# Patient Record
Sex: FEMALE | Race: WHITE
[De-identification: names, ages, dates, MRNs, and addresses within clinical notes are randomized per-mention and may not be internally consistent; named-entity substitution may affect disease eponyms.]

---

## 2017-06-19 ENCOUNTER — HOSPITAL ENCOUNTER (OUTPATIENT)
Dept: HOSPITAL 53 - M SFHCCLAY | Age: 77
End: 2017-06-19
Attending: FAMILY MEDICINE
Payer: MEDICARE

## 2017-06-19 DIAGNOSIS — E55.9: ICD-10-CM

## 2017-06-19 DIAGNOSIS — E78.2: Primary | ICD-10-CM

## 2017-06-19 PROCEDURE — 80053 COMPREHEN METABOLIC PANEL: CPT

## 2017-06-19 PROCEDURE — 80061 LIPID PANEL: CPT

## 2017-06-19 PROCEDURE — 82652 VIT D 1 25-DIHYDROXY: CPT

## 2017-06-20 LAB
ALBUMIN SERPL BCG-MCNC: 4 GM/DL (ref 3.2–5.2)
ALBUMIN/GLOB SERPL: 1.48 {RATIO} (ref 1–1.93)
ALP SERPL-CCNC: 57 U/L (ref 45–117)
ALT SERPL W P-5'-P-CCNC: 30 U/L (ref 12–78)
ANION GAP SERPL CALC-SCNC: 9 MEQ/L (ref 8–16)
AST SERPL-CCNC: 20 U/L (ref 15–37)
BILIRUB SERPL-MCNC: 0.4 MG/DL (ref 0.2–1)
BUN SERPL-MCNC: 15 MG/DL (ref 7–18)
CALCIUM SERPL-MCNC: 9 MG/DL (ref 8.8–10.2)
CHLORIDE SERPL-SCNC: 101 MEQ/L (ref 98–107)
CHOLEST SERPL-MCNC: 297 MG/DL (ref ?–200)
CO2 SERPL-SCNC: 29 MEQ/L (ref 21–32)
CREAT SERPL-MCNC: 0.71 MG/DL (ref 0.55–1.02)
GFR SERPL CREATININE-BSD FRML MDRD: > 60 ML/MIN/{1.73_M2} (ref 39–?)
GLUCOSE SERPL-MCNC: 93 MG/DL (ref 83–110)
POTASSIUM SERPL-SCNC: 3.7 MEQ/L (ref 3.5–5.1)
PROT SERPL-MCNC: 6.7 GM/DL (ref 6.4–8.2)
SODIUM SERPL-SCNC: 139 MEQ/L (ref 136–145)
TRIGL SERPL-MCNC: 101 MG/DL (ref ?–150)

## 2017-12-06 ENCOUNTER — HOSPITAL ENCOUNTER (OUTPATIENT)
Dept: HOSPITAL 53 - M SFHCCLAY | Age: 77
End: 2017-12-06
Attending: FAMILY MEDICINE
Payer: MEDICARE

## 2017-12-06 DIAGNOSIS — E78.2: Primary | ICD-10-CM

## 2017-12-06 LAB
ALBUMIN SERPL BCG-MCNC: 4.1 GM/DL (ref 3.2–5.2)
ALBUMIN/GLOB SERPL: 1.58 {RATIO} (ref 1–1.93)
ALP SERPL-CCNC: 54 U/L (ref 45–117)
ALT SERPL W P-5'-P-CCNC: 29 U/L (ref 12–78)
ANION GAP SERPL CALC-SCNC: 5 MEQ/L (ref 8–16)
AST SERPL-CCNC: 22 U/L (ref 7–37)
BILIRUB SERPL-MCNC: 0.5 MG/DL (ref 0.2–1)
BUN SERPL-MCNC: 10 MG/DL (ref 7–18)
CALCIUM SERPL-MCNC: 8.8 MG/DL (ref 8.8–10.2)
CHLORIDE SERPL-SCNC: 99 MEQ/L (ref 98–107)
CHOLEST SERPL-MCNC: 276 MG/DL (ref ?–200)
CO2 SERPL-SCNC: 34 MEQ/L (ref 21–32)
CREAT SERPL-MCNC: 0.53 MG/DL (ref 0.55–1.02)
GFR SERPL CREATININE-BSD FRML MDRD: > 60 ML/MIN/{1.73_M2} (ref 39–?)
GLUCOSE SERPL-MCNC: 88 MG/DL (ref 83–110)
POTASSIUM SERPL-SCNC: 4.1 MEQ/L (ref 3.5–5.1)
PROT SERPL-MCNC: 6.7 GM/DL (ref 6.4–8.2)
SODIUM SERPL-SCNC: 138 MEQ/L (ref 136–145)
TRIGL SERPL-MCNC: 59 MG/DL (ref ?–150)

## 2018-08-02 ENCOUNTER — HOSPITAL ENCOUNTER (OUTPATIENT)
Dept: HOSPITAL 53 - M CLY | Age: 78
End: 2018-08-02
Attending: NURSE PRACTITIONER
Payer: MEDICARE
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
## 2018-08-02 DIAGNOSIS — M19.042: Primary | ICD-10-CM
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
## 2018-08-02 PROCEDURE — 73130 X-RAY EXAM OF HAND: CPT

## 2018-11-06 ENCOUNTER — HOSPITAL ENCOUNTER (OUTPATIENT)
Dept: HOSPITAL 53 - M SFHCCLAY | Age: 78
End: 2018-11-06
Attending: FAMILY MEDICINE
Payer: MEDICARE

## 2018-11-06 DIAGNOSIS — Z01.818: Primary | ICD-10-CM

## 2018-11-06 DIAGNOSIS — C91.10: ICD-10-CM

## 2018-11-06 DIAGNOSIS — M79.674: ICD-10-CM

## 2018-11-06 DIAGNOSIS — E78.2: ICD-10-CM

## 2018-11-06 LAB
ALBUMIN/GLOBULIN RATIO: 1.38 (ref 1–1.93)
ALBUMIN: 4 GM/DL (ref 3.2–5.2)
ALKALINE PHOSPHATASE: 75 U/L (ref 45–117)
ALT SERPL W P-5'-P-CCNC: 23 U/L (ref 12–78)
ANION GAP: 9 MEQ/L (ref 8–16)
AST SERPL-CCNC: 21 U/L (ref 7–37)
BASO #: 0.1 10^3/UL (ref 0–0.2)
BASO %: 0.9 % (ref 0–1)
BILIRUBIN,TOTAL: 0.5 MG/DL (ref 0.2–1)
BLOOD UREA NITROGEN: 14 MG/DL (ref 7–18)
CALCIUM LEVEL: 9.4 MG/DL (ref 8.8–10.2)
CARBON DIOXIDE LEVEL: 31 MEQ/L (ref 21–32)
CHLORIDE LEVEL: 99 MEQ/L (ref 98–107)
CHOLEST SERPL-MCNC: 280 MG/DL (ref ?–200)
CHOLESTEROL RISK RATIO: 2.57 (ref ?–5)
CREATININE FOR GFR: 0.71 MG/DL (ref 0.55–1.3)
EOS #: 0.1 10^3/UL (ref 0–0.5)
EOSINOPHIL NFR BLD AUTO: 2 % (ref 0–3)
GFR SERPL CREATININE-BSD FRML MDRD: > 60 ML/MIN/{1.73_M2} (ref 39–?)
GLUCOSE, FASTING: 65 MG/DL (ref 70–100)
HDLC SERPL-MCNC: 109 MG/DL (ref 40–?)
HEMATOCRIT: 42.2 % (ref 36–47)
HEMOGLOBIN: 14 G/DL (ref 12–15.5)
IMMATURE GRANULOCYTE %: 0.1 % (ref 0–3)
INR: 0.94
LDL CHOLESTEROL: 161 MG/DL (ref ?–100)
LYMPH #: 2.2 10^3/UL (ref 1.5–4.5)
LYMPH %: 31.5 % (ref 24–44)
MEAN CORPUSCULAR HEMOGLOBIN: 32.3 PG (ref 27–33)
MEAN CORPUSCULAR HGB CONC: 33.2 G/DL (ref 32–36.5)
MEAN CORPUSCULAR VOLUME: 97.5 FL (ref 80–96)
MONO #: 0.7 10^3/UL (ref 0–0.8)
MONO %: 10.3 % (ref 0–5)
NEUTROPHILS #: 3.9 10^3/UL (ref 1.8–7.7)
NEUTROPHILS %: 55.2 % (ref 36–66)
NONHDLC SERPL-MCNC: 171 MG/DL
NRBC BLD AUTO-RTO: 0 % (ref 0–0)
PARTIAL THROMBOPLASTIN TIME: 23.6 SECONDS (ref 25.4–37.6)
PLATELET COUNT, AUTOMATED: 282 10^3/UL (ref 150–450)
POTASSIUM SERUM: 4.3 MEQ/L (ref 3.5–5.1)
PROTHROMBIN TIME: 12.7 SECONDS (ref 12.1–14.4)
RED BLOOD COUNT: 4.33 10^6/UL (ref 4–5.4)
RED CELL DISTRIBUTION WIDTH: 13 % (ref 11.5–14.5)
SODIUM LEVEL: 139 MEQ/L (ref 136–145)
TOTAL PROTEIN: 6.9 GM/DL (ref 6.4–8.2)
TRIGLYCERIDES LEVEL: 51 MG/DL (ref ?–150)
WHITE BLOOD COUNT: 7 10^3/UL (ref 4–10)

## 2018-11-06 PROCEDURE — 80053 COMPREHEN METABOLIC PANEL: CPT

## 2019-10-04 ENCOUNTER — HOSPITAL ENCOUNTER (OUTPATIENT)
Dept: HOSPITAL 53 - M SFHCCLAY | Age: 79
End: 2019-10-04
Attending: FAMILY MEDICINE
Payer: MEDICARE

## 2019-10-04 DIAGNOSIS — Z01.818: Primary | ICD-10-CM

## 2019-10-04 DIAGNOSIS — M21.611: ICD-10-CM

## 2019-10-04 LAB
ALBUMIN SERPL BCG-MCNC: 3.8 GM/DL (ref 3.2–5.2)
ALT SERPL W P-5'-P-CCNC: 21 U/L (ref 12–78)
BASOPHILS # BLD AUTO: 0.1 10^3/UL (ref 0–0.2)
BASOPHILS NFR BLD AUTO: 0.8 % (ref 0–1)
BILIRUB SERPL-MCNC: 0.3 MG/DL (ref 0.2–1)
BUN SERPL-MCNC: 15 MG/DL (ref 7–18)
CALCIUM SERPL-MCNC: 9.2 MG/DL (ref 8.8–10.2)
CHLORIDE SERPL-SCNC: 98 MEQ/L (ref 98–107)
CO2 SERPL-SCNC: 33 MEQ/L (ref 21–32)
CREAT SERPL-MCNC: 0.66 MG/DL (ref 0.55–1.3)
EOSINOPHIL # BLD AUTO: 0.1 10^3/UL (ref 0–0.5)
EOSINOPHIL NFR BLD AUTO: 1.6 % (ref 0–3)
GFR SERPL CREATININE-BSD FRML MDRD: > 60 ML/MIN/{1.73_M2} (ref 39–?)
GLUCOSE SERPL-MCNC: 70 MG/DL (ref 70–100)
HCT VFR BLD AUTO: 41.4 % (ref 36–47)
HGB BLD-MCNC: 13.4 G/DL (ref 12–15.5)
LYMPHOCYTES # BLD AUTO: 2 10^3/UL (ref 1.5–5)
LYMPHOCYTES NFR BLD AUTO: 33.4 % (ref 24–44)
MCH RBC QN AUTO: 31.7 PG (ref 27–33)
MCHC RBC AUTO-ENTMCNC: 32.4 G/DL (ref 32–36.5)
MCV RBC AUTO: 97.9 FL (ref 80–96)
MONOCYTES # BLD AUTO: 0.5 10^3/UL (ref 0–0.8)
MONOCYTES NFR BLD AUTO: 7.4 % (ref 0–5)
NEUTROPHILS # BLD AUTO: 3.4 10^3/UL (ref 1.5–8.5)
NEUTROPHILS NFR BLD AUTO: 56.6 % (ref 36–66)
PLATELET # BLD AUTO: 238 10^3/UL (ref 150–450)
POTASSIUM SERPL-SCNC: 4.4 MEQ/L (ref 3.5–5.1)
PROT SERPL-MCNC: 6.5 GM/DL (ref 6.4–8.2)
RBC # BLD AUTO: 4.23 10^6/UL (ref 4–5.4)
SODIUM SERPL-SCNC: 136 MEQ/L (ref 136–145)
WBC # BLD AUTO: 6.1 10^3/UL (ref 4–10)

## 2019-10-17 ENCOUNTER — HOSPITAL ENCOUNTER (OUTPATIENT)
Dept: HOSPITAL 53 - M CLY | Age: 79
End: 2019-10-17
Attending: FAMILY MEDICINE
Payer: MEDICARE

## 2019-10-17 DIAGNOSIS — Z01.818: Primary | ICD-10-CM

## 2019-10-17 DIAGNOSIS — M21.611: ICD-10-CM

## 2019-10-17 PROCEDURE — 71046 X-RAY EXAM CHEST 2 VIEWS: CPT

## 2019-10-17 PROCEDURE — 93005 ELECTROCARDIOGRAM TRACING: CPT

## 2019-10-17 NOTE — REP
Two-view chest:  10/17/2019.

 

Indication:  Preoperative assessment.

 

Comparison:  None.

 

Findings:

 

The lungs are clear.  There is no pleural effusion.  There is no pneumothorax.

Cardiac silhouette is unremarkable.

 

Impression:

 

No acute cardiopulmonary process.

 

 

Electronically Signed by

Torsten Vuong DO 10/17/2019 10:20 A

## 2021-02-01 ENCOUNTER — HOSPITAL ENCOUNTER (OUTPATIENT)
Dept: HOSPITAL 53 - M SFHCCLAY | Age: 81
End: 2021-02-01
Attending: FAMILY MEDICINE
Payer: MEDICARE

## 2021-02-01 DIAGNOSIS — E78.2: Primary | ICD-10-CM

## 2021-02-01 DIAGNOSIS — E55.9: ICD-10-CM

## 2021-02-01 PROCEDURE — 82652 VIT D 1 25-DIHYDROXY: CPT

## 2021-02-01 PROCEDURE — 80061 LIPID PANEL: CPT

## 2021-02-02 LAB
CHOLEST SERPL-MCNC: 257 MG/DL (ref ?–200)
CHOLEST/HDLC SERPL: 2.4 {RATIO} (ref ?–5)
HDLC SERPL-MCNC: 107 MG/DL (ref 40–?)
LDLC SERPL CALC-MCNC: 136 MG/DL (ref ?–100)
NONHDLC SERPL-MCNC: 150 MG/DL
TRIGL SERPL-MCNC: 70 MG/DL (ref ?–150)

## 2021-02-13 ENCOUNTER — HOSPITAL ENCOUNTER (OUTPATIENT)
Dept: HOSPITAL 53 - M LABSMTC | Age: 81
End: 2021-02-13
Attending: PEDIATRICS
Payer: SELF-PAY

## 2021-02-13 DIAGNOSIS — Z20.822: Primary | ICD-10-CM

## 2021-04-18 ENCOUNTER — HOSPITAL ENCOUNTER (OUTPATIENT)
Dept: HOSPITAL 53 - M LABSMTC | Age: 81
End: 2021-04-18
Attending: PEDIATRICS
Payer: SELF-PAY

## 2021-04-18 DIAGNOSIS — Z20.828: Primary | ICD-10-CM

## 2021-04-18 DIAGNOSIS — Z11.59: ICD-10-CM

## 2021-05-20 ENCOUNTER — HOSPITAL ENCOUNTER (OUTPATIENT)
Dept: HOSPITAL 53 - M PLARAD | Age: 81
End: 2021-05-20
Attending: PHYSICIAN ASSISTANT
Payer: MEDICARE

## 2021-05-20 DIAGNOSIS — G95.9: ICD-10-CM

## 2021-05-20 DIAGNOSIS — M48.03: ICD-10-CM

## 2021-05-20 DIAGNOSIS — M48.02: Primary | ICD-10-CM

## 2021-05-20 NOTE — REP
INDICATION:

MYELOPATHY, HEADACHES.



COMPARISON:

Comparison MRI study of the cervical spine is from September 5, 2019..



TECHNIQUE:

Sagittal and axial T1 and T2-weighted scans are acquired in the usual fashion with and

without fat saturation.  Sequences include spin echo, turbo spin-echo, and STIR

imaging sequences.



FINDINGS:

There is straightening and reversal of the normal cervical lordosis.  Cervical

vertebral body heights are preserved.  There is diffuse degenerative spondylosis in

the cervical spine as seen previously.  There is a degenerative, 5 mm

spondylolisthesis at C7-T1.  This previously measured 4 mm.  There is a stable 2 mm

C3-4 spondylolisthesis again degenerative in nature.  Mild spinal stenosis is again

noted at the C7-T1 disc level.  Midline AP dimension of the thecal sac at this level

is 7.8 mm.  This is unchanged.  The cervical cord remains normal in course, caliber

and signal intensity.  No focal T2 hyperintense lesion is seen within the substance of

the cord.



Posterior osteophytic ridging and disc bulging are seen at C3-4, C4-5, C5-6, and C6-7.

There is mild central canal stenosis at C5-6 due to these findings.  There is is

unchanged.  The previously noted T2 edema a in the soft tissues adjacent to the left

C4-5 facets is no longer apparent.  There is some reactive marrow edema associated

with degenerative disc disease changes at C5-6 C6-7.  This is unchanged.



IMPRESSION:

Advanced degenerative spondylosis changes.  Central canal stenosis at C5-6 and at

C7-T1.  There is a degenerative 5 mm C7-T1 spondylolisthesis, previously measured at 4

mm.  Findings are otherwise unchanged.





<Electronically signed by Eldon Mireles > 05/20/21 [id number]

## 2021-07-06 ENCOUNTER — HOSPITAL ENCOUNTER (OUTPATIENT)
Dept: HOSPITAL 53 - M PLAIMG | Age: 81
End: 2021-07-06
Attending: STUDENT IN AN ORGANIZED HEALTH CARE EDUCATION/TRAINING PROGRAM
Payer: MEDICARE

## 2021-07-06 DIAGNOSIS — M51.26: ICD-10-CM

## 2021-07-06 DIAGNOSIS — M51.36: ICD-10-CM

## 2021-07-06 DIAGNOSIS — M47.816: Primary | ICD-10-CM

## 2021-07-06 NOTE — REPVR
PROCEDURE INFORMATION: 

Exam: MR Lumbar Spine Without Contrast 

Exam date and time: 7/6/2021 11:21 AM 

Age: 80 years old 

Clinical indication: Low back pain and other: Neck 



TECHNIQUE: 

Imaging protocol: Multiplanar magnetic resonance images of the lumbar spine 

without intravenous contrast. 



COMPARISON: 

No relevant prior studies available. 



FINDINGS: 

Vertebrae: No acute compression fracture is seen. There is mild retrolisthesis 

of L2 on L3 and L3 on L4. There is mild anterolisthesis of L5 on S1. 

Spinal cord: The conus medullaris terminates at the T12-L1 level. There is no 

evidence of arachnoiditis or cauda equina compression. 

L1-L2: There is marked diffuse circumferential disc bulging, right 

extraforaminal osteophytic ridging, moderate facet arthropathy, and thickening 

of the ligamentum flavum. This is causing mild spinal canal stenosis, moderate 

narrowing of the right subarticular recess, and mild right neural foraminal 

narrowing. 

L2-L3: There is disc dehydration, severe disc space narrowing, marked diffuse 

circumferential disc bulging, mild facet arthropathy, and thickening of the 

ligamentum flavum. This is causing mild spinal canal stenosis, moderate 

narrowing of the left subarticular recess, and mild left neural foraminal 

narrowing. 

L3-L4: There is severe disc space narrowing, marked diffuse circumferential 

disc bulging, circumferential osteophytic ridging, thickening of the ligamentum 

flavum, and moderate facet arthropathy. This is causing mild spinal canal 

stenosis, moderate narrowing of the subarticular recesses, and moderate 

bilateral neural foraminal narrowing. 

L4-L5: There is moderate diffuse circumferential disc bulging, right foraminal 

and extraforaminal osteophytic ridging, moderate facet arthropathy, and 

thickening of the ligamentum flavum. There is no significant spinal canal 

stenosis. Moderate right and mild left neural foraminal narrowing is present. 

L5-S1: There is marked diffuse circumferential disc bulging and severe facet 

arthropathy. This is causing mild spinal canal stenosis, moderate narrowing of 

the right subarticular recess, moderate right neural foraminal narrowing, and 

mild left neural foraminal narrowing. 

Soft tissues: Unremarkable. 



IMPRESSION: 

Marked degenerative changes of the lumbar spine as discussed above 



Electronically signed by: Khari Camarena On 07/06/2021  15:16:13 PM

## 2021-09-24 ENCOUNTER — HOSPITAL ENCOUNTER (OUTPATIENT)
Dept: HOSPITAL 53 - M SFHCCLAY | Age: 81
End: 2021-09-24
Attending: FAMILY MEDICINE
Payer: MEDICARE

## 2021-09-24 DIAGNOSIS — E78.2: Primary | ICD-10-CM

## 2021-09-24 LAB
BUN SERPL-MCNC: 15 MG/DL (ref 7–18)
CALCIUM SERPL-MCNC: 9.6 MG/DL (ref 8.8–10.2)
CHLORIDE SERPL-SCNC: 97 MEQ/L (ref 98–107)
CO2 SERPL-SCNC: 30 MEQ/L (ref 21–32)
CREAT SERPL-MCNC: 0.59 MG/DL (ref 0.55–1.3)
GFR SERPL CREATININE-BSD FRML MDRD: > 60 ML/MIN/{1.73_M2} (ref 39–?)
GLUCOSE SERPL-MCNC: 89 MG/DL (ref 70–100)
POTASSIUM SERPL-SCNC: 4.1 MEQ/L (ref 3.5–5.1)
SODIUM SERPL-SCNC: 133 MEQ/L (ref 136–145)

## 2022-03-29 ENCOUNTER — HOSPITAL ENCOUNTER (OUTPATIENT)
Dept: HOSPITAL 53 - M SFHCCLAY | Age: 82
End: 2022-03-29
Attending: FAMILY MEDICINE
Payer: MEDICARE

## 2022-03-29 DIAGNOSIS — E78.2: ICD-10-CM

## 2022-03-29 DIAGNOSIS — K21.9: ICD-10-CM

## 2022-03-29 DIAGNOSIS — C91.10: ICD-10-CM

## 2022-03-29 DIAGNOSIS — E55.9: Primary | ICD-10-CM

## 2022-03-29 LAB
ALBUMIN SERPL BCG-MCNC: 3.7 GM/DL (ref 3.2–5.2)
ALT SERPL W P-5'-P-CCNC: 23 U/L (ref 12–78)
BASOPHILS NFR BLD MANUAL: 1 % (ref 0–1)
BILIRUB SERPL-MCNC: 0.2 MG/DL (ref 0.2–1)
BUN SERPL-MCNC: 15 MG/DL (ref 7–18)
CALCIUM SERPL-MCNC: 9.5 MG/DL (ref 8.8–10.2)
CHLORIDE SERPL-SCNC: 102 MEQ/L (ref 98–107)
CHOLEST SERPL-MCNC: 240 MG/DL (ref ?–200)
CHOLEST/HDLC SERPL: 2.45 {RATIO} (ref ?–5)
CO2 SERPL-SCNC: 33 MEQ/L (ref 21–32)
CREAT SERPL-MCNC: 0.61 MG/DL (ref 0.55–1.3)
EOSINOPHIL NFR BLD MANUAL: 2 % (ref 0–3)
GFR SERPL CREATININE-BSD FRML MDRD: > 60 ML/MIN/{1.73_M2} (ref 39–?)
GLUCOSE SERPL-MCNC: 76 MG/DL (ref 70–100)
HCT VFR BLD AUTO: 40.9 % (ref 36–47)
HDLC SERPL-MCNC: 98 MG/DL (ref 40–?)
HGB BLD-MCNC: 13.4 G/DL (ref 12–15.5)
LDLC SERPL CALC-MCNC: 133 MG/DL (ref ?–100)
LYMPHOCYTES NFR BLD MANUAL: 39 % (ref 16–44)
MAGNESIUM SERPL-MCNC: 2.3 MG/DL (ref 1.8–2.4)
MCH RBC QN AUTO: 32.6 PG (ref 27–33)
MCHC RBC AUTO-ENTMCNC: 32.8 G/DL (ref 32–36.5)
MCV RBC AUTO: 99.5 FL (ref 80–96)
MONOCYTES NFR BLD MANUAL: 5 % (ref 0–5)
NEUTROPHILS NFR BLD MANUAL: 40 % (ref 28–66)
NONHDLC SERPL-MCNC: 142 MG/DL
PLATELET # BLD AUTO: 172 10^3/UL (ref 150–450)
PLATELET BLD QL SMEAR: NORMAL
POTASSIUM SERPL-SCNC: 4.7 MEQ/L (ref 3.5–5.1)
PROT SERPL-MCNC: 6.1 GM/DL (ref 6.4–8.2)
RBC # BLD AUTO: 4.11 10^6/UL (ref 4–5.4)
RBC MORPH BLD: NORMAL
SODIUM SERPL-SCNC: 136 MEQ/L (ref 136–145)
TRIGL SERPL-MCNC: 45 MG/DL (ref ?–150)
VARIANT LYMPHS NFR BLD MANUAL: 13 % (ref 0–5)
WBC # BLD AUTO: 9.2 10^3/UL (ref 4–10)

## 2022-04-06 ENCOUNTER — HOSPITAL ENCOUNTER (OUTPATIENT)
Dept: HOSPITAL 53 - M CLY | Age: 82
End: 2022-04-06
Attending: PHYSICIAN ASSISTANT
Payer: MEDICARE
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
## 2022-04-06 DIAGNOSIS — S99.911A: Primary | ICD-10-CM
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
## 2022-11-14 ENCOUNTER — HOSPITAL ENCOUNTER (OUTPATIENT)
Dept: HOSPITAL 53 - M RAD | Age: 82
End: 2022-11-14
Attending: PHYSICIAN ASSISTANT
Payer: MEDICARE

## 2022-11-14 DIAGNOSIS — I83.813: Primary | ICD-10-CM

## 2022-12-06 ENCOUNTER — HOSPITAL ENCOUNTER (OUTPATIENT)
Dept: HOSPITAL 53 - M PLAIMG | Age: 82
End: 2022-12-06
Attending: FAMILY MEDICINE
Payer: MEDICARE

## 2022-12-06 DIAGNOSIS — M25.78: Primary | ICD-10-CM

## 2022-12-06 DIAGNOSIS — M47.892: ICD-10-CM

## 2023-01-26 ENCOUNTER — HOSPITAL ENCOUNTER (OUTPATIENT)
Dept: HOSPITAL 53 - M CLY | Age: 83
End: 2023-01-26
Attending: FAMILY MEDICINE
Payer: MEDICARE

## 2023-01-26 DIAGNOSIS — M25.572: Primary | ICD-10-CM

## 2023-08-16 ENCOUNTER — HOSPITAL ENCOUNTER (OUTPATIENT)
Dept: HOSPITAL 53 - M SFHCCLAY | Age: 83
End: 2023-08-16
Attending: FAMILY MEDICINE
Payer: MEDICARE

## 2023-08-16 DIAGNOSIS — R19.7: Primary | ICD-10-CM

## 2023-09-11 ENCOUNTER — HOSPITAL ENCOUNTER (OUTPATIENT)
Dept: HOSPITAL 53 - M SFHCCLAY | Age: 83
End: 2023-09-11
Attending: FAMILY MEDICINE
Payer: MEDICARE

## 2023-09-11 DIAGNOSIS — R19.7: Primary | ICD-10-CM

## 2023-09-11 DIAGNOSIS — E78.2: ICD-10-CM

## 2023-09-11 DIAGNOSIS — E55.9: ICD-10-CM

## 2023-09-11 DIAGNOSIS — Z78.9: ICD-10-CM

## 2023-09-11 LAB
ALBUMIN SERPL BCG-MCNC: 3.8 G/DL (ref 3.2–5.2)
ALP SERPL-CCNC: 93 U/L (ref 46–116)
ALT SERPL W P-5'-P-CCNC: 21 U/L (ref 7–40)
AMYLASE SERPL-CCNC: 65 U/L (ref 30–118)
AST SERPL-CCNC: 17 U/L (ref ?–34)
BASOPHILS # BLD AUTO: 0 10^3/UL (ref 0–0.2)
BASOPHILS NFR BLD AUTO: 0.3 % (ref 0–1)
BILIRUB SERPL-MCNC: 0.4 MG/DL (ref 0.3–1.2)
BUN SERPL-MCNC: 15 MG/DL (ref 9–23)
CALCIUM SERPL-MCNC: 9.6 MG/DL (ref 8.3–10.6)
CHLORIDE SERPL-SCNC: 102 MMOL/L (ref 98–107)
CHOLEST SERPL-MCNC: 212 MG/DL (ref ?–200)
CHOLEST/HDLC SERPL: 2.2 {RATIO} (ref ?–5)
CO2 SERPL-SCNC: 33 MMOL/L (ref 20–31)
CREAT SERPL-MCNC: 0.68 MG/DL (ref 0.55–1.3)
EOSINOPHIL # BLD AUTO: 0.1 10^3/UL (ref 0–0.5)
EOSINOPHIL NFR BLD AUTO: 1 % (ref 0–3)
GFR SERPL CREATININE-BSD FRML MDRD: > 60 ML/MIN/{1.73_M2} (ref 32–?)
GLUCOSE SERPL-MCNC: 84 MG/DL (ref 74–106)
HCT VFR BLD AUTO: 38.4 % (ref 36–47)
HDLC SERPL-MCNC: 96.3 MG/DL (ref 40–?)
HGB BLD-MCNC: 12.8 G/DL (ref 12–15.5)
LDLC SERPL CALC-MCNC: 105.1 MG/DL (ref ?–100)
LIPASE SERPL-CCNC: 37 U/L (ref 12–53)
LYMPHOCYTES # BLD AUTO: 4.4 10^3/UL (ref 1.5–5)
LYMPHOCYTES NFR BLD AUTO: 47 % (ref 24–44)
MAGNESIUM SERPL-MCNC: 2 MG/DL (ref 1.8–2.4)
MCH RBC QN AUTO: 33.1 PG (ref 27–33)
MCHC RBC AUTO-ENTMCNC: 33.3 G/DL (ref 32–36.5)
MCV RBC AUTO: 99.2 FL (ref 80–96)
MONOCYTES # BLD AUTO: 0.6 10^3/UL (ref 0–0.8)
MONOCYTES NFR BLD AUTO: 6.4 % (ref 2–8)
NEUTROPHILS # BLD AUTO: 4.2 10^3/UL (ref 1.5–8.5)
NEUTROPHILS NFR BLD AUTO: 45.1 % (ref 36–66)
NONHDLC SERPL-MCNC: 115.7 MG/DL
PLATELET # BLD AUTO: 162 10^3/UL (ref 150–450)
POTASSIUM SERPL-SCNC: 4 MMOL/L (ref 3.5–5.1)
PROT SERPL-MCNC: 5.8 G/DL (ref 5.7–8.2)
RBC # BLD AUTO: 3.87 10^6/UL (ref 4–5.4)
SODIUM SERPL-SCNC: 138 MMOL/L (ref 136–145)
TRIGL SERPL-MCNC: 53 MG/DL (ref ?–150)
TSH SERPL DL<=0.005 MIU/L-ACNC: 3.24 UIU/ML (ref 0.55–4.78)
WBC # BLD AUTO: 9.3 10^3/UL (ref 4–10)

## 2023-09-22 ENCOUNTER — HOSPITAL ENCOUNTER (OUTPATIENT)
Dept: HOSPITAL 53 - M SFHCCLAY | Age: 83
End: 2023-09-22
Attending: FAMILY MEDICINE
Payer: MEDICARE

## 2023-09-22 DIAGNOSIS — R19.7: Primary | ICD-10-CM

## 2023-11-14 ENCOUNTER — HOSPITAL ENCOUNTER (OUTPATIENT)
Dept: HOSPITAL 53 - M CLY | Age: 83
End: 2023-11-14
Attending: PHYSICIAN ASSISTANT
Payer: MEDICARE

## 2023-11-14 DIAGNOSIS — R05.3: Primary | ICD-10-CM

## 2024-05-17 ENCOUNTER — HOSPITAL ENCOUNTER (OUTPATIENT)
Dept: HOSPITAL 53 - M OPP | Age: 84
Discharge: HOME | End: 2024-05-17
Attending: INTERNAL MEDICINE
Payer: MEDICARE

## 2024-05-17 VITALS — OXYGEN SATURATION: 97 % | SYSTOLIC BLOOD PRESSURE: 146 MMHG | TEMPERATURE: 97.3 F | DIASTOLIC BLOOD PRESSURE: 67 MMHG

## 2024-05-17 VITALS — WEIGHT: 109.8 LBS | HEIGHT: 61 IN | BODY MASS INDEX: 20.73 KG/M2

## 2024-05-17 DIAGNOSIS — Z85.6: ICD-10-CM

## 2024-05-17 DIAGNOSIS — Z79.899: ICD-10-CM

## 2024-05-17 DIAGNOSIS — Z88.0: ICD-10-CM

## 2024-05-17 DIAGNOSIS — K57.30: ICD-10-CM

## 2024-05-17 DIAGNOSIS — Z90.710: ICD-10-CM

## 2024-05-17 DIAGNOSIS — D12.8: ICD-10-CM

## 2024-05-17 DIAGNOSIS — Z92.21: ICD-10-CM

## 2024-05-17 DIAGNOSIS — K64.8: ICD-10-CM

## 2024-05-17 DIAGNOSIS — Z79.82: ICD-10-CM

## 2024-05-17 DIAGNOSIS — K63.89: ICD-10-CM

## 2024-05-17 DIAGNOSIS — K64.4: ICD-10-CM

## 2024-05-17 DIAGNOSIS — R32: ICD-10-CM

## 2024-05-17 DIAGNOSIS — K52.9: Primary | ICD-10-CM

## 2024-05-17 DIAGNOSIS — Z88.8: ICD-10-CM

## 2024-05-17 RX ADMIN — SODIUM CHLORIDE ONE MLS/HR: 9 INJECTION, SOLUTION INTRAVENOUS at 07:18

## 2024-06-12 ENCOUNTER — HOSPITAL ENCOUNTER (OUTPATIENT)
Dept: HOSPITAL 53 - M SFHCCLAY | Age: 84
End: 2024-06-12
Attending: FAMILY MEDICINE
Payer: MEDICARE

## 2024-06-12 DIAGNOSIS — K52.9: Primary | ICD-10-CM

## 2025-02-20 ENCOUNTER — HOSPITAL ENCOUNTER (OUTPATIENT)
Dept: HOSPITAL 53 - M PLAIMG | Age: 85
End: 2025-02-20
Attending: ORTHOPAEDIC SURGERY
Payer: MEDICARE

## 2025-02-20 DIAGNOSIS — M25.511: Primary | ICD-10-CM
